# Patient Record
(demographics unavailable — no encounter records)

---

## 2025-07-10 NOTE — CHIEF COMPLAINT
[Language Line ] : provided by [employer] [facility]   [Time Spent: ____ minutes] : Total time spent using  services: [unfilled] minutes. The patient's primary language is not English thus required  services. [Interpreters_IDNumber] : [id number] [Interpreters_FullName] : Latrell

## 2025-07-10 NOTE — PROCEDURE
[Colposcopy] : colposcopy [Abnormal Pap] : an abnormal pap smear [HGSIL] : HGSIL [HPV high risk] : PCR positive for high risk HPV [Patient] : the patient [Written Consent] : written consent was obtained prior to the procedure and is detailed in the patient's record [Allergies Reviewed] : Allergies reviewed [Acetowhite ___ o'clock] : ascetowhite changes at the [unfilled] ~Uo'clock position [Atypical Vessels ___ o'clock] : atypical vessels at the [unfilled] ~Uo'clock position [Mosaicism ___ o'clock] : mosaicism at the [unfilled] ~Uo'clock position [No Abnormalities] : no abnormalities seen [Biopsies Taken: # ___] : [unfilled] biopsies taken of the cervix [ECC Done] : an Endocervical curettage was performed.  [Biopsy Locations ___ o'clock] : the biopsies were taken at the [unfilled] o'clock position [Direct Pressure] : direct pressure [No Complications] : none [Tolerated Well] : the patient tolerated the procedure well [Post procedure instructions and information given] : post procedure instructions and information given and reviewed with patient.

## 2025-07-10 NOTE — HISTORY OF PRESENT ILLNESS
[FreeTextEntry1] : 59 yo with HSIL HPV+ pap  4/2025 HSIL HR HPV+  Has had abn pap since at least 2021   10/2021 LSIL HPV+ 10/2022 LSIL HPV+  Has never had colpo or excision in past that she can recall. States no abn pap prior to 2021.   since 2023, no bleeding.  No pain   HCM: Pap: as above Mammo: C-scope PMH: HTN, asthma- intubation in 2022 with Flu, Epilepsy dx 15 years ago last seizure 2022 PSH: Abdominoplasty, Breast augmentation, Mid-urethral sling  Gyn Hx: Hx of stress incontinence Denies history of ovarian cysts, fibroids, endometriosis, pelvic infections Ob Hx:  SVDx3 Meds: Lisinopril, Keppra, Albuterol, Additional asthma medication unsure of name Allergies: Shrimp-rash FH: Mat Grandmother- thyroid, unknown age SH: Never smoker, occ etoh, no drugs , sexually active, not working

## 2025-07-10 NOTE — PHYSICAL EXAM
[Chaperoned Physical Exam] : A chaperone was present in the examining room during all aspects of the physical examination. [Abnormal] : Cervix: Abnormal [Normal] : Recto-Vaginal Exam: Normal [Fully active, able to carry on all pre-disease performance without restriction] : Status 0 - Fully active, able to carry on all pre-disease performance without restriction [FreeTextEntry2] : Dante [de-identified] : cervix with atypical vessels 9:00

## 2025-07-10 NOTE — DISCUSSION/SUMMARY
[Reviewed Clinical Lab Test(s)] : Results of clinical tests were reviewed. [Discuss Alternatives/Risks/Benefits w/Patient] : All alternatives, risks, and benefits were discussed with the patient/family and all questions were answered.  Patient expressed good understanding and appreciates the importance of follow up as recommended. [Visit Time ___ Minutes] : [unfilled] minutes [FreeTextEntry1] : 59 yo with abn pap since 2021  Now with HSIL HRHPV+  Colpo done, follow up path   Likely will need excision. Discussed this with patient Will await pathology and make decision on exision   Irina Tracey MD, FACOG  Gynecologic Oncology